# Patient Record
Sex: MALE | Race: WHITE | NOT HISPANIC OR LATINO | URBAN - METROPOLITAN AREA
[De-identification: names, ages, dates, MRNs, and addresses within clinical notes are randomized per-mention and may not be internally consistent; named-entity substitution may affect disease eponyms.]

---

## 2019-03-01 ENCOUNTER — EMERGENCY (EMERGENCY)
Facility: HOSPITAL | Age: 49
LOS: 0 days | Discharge: HOME | End: 2019-03-02
Attending: EMERGENCY MEDICINE | Admitting: EMERGENCY MEDICINE

## 2019-03-01 VITALS
SYSTOLIC BLOOD PRESSURE: 166 MMHG | RESPIRATION RATE: 18 BRPM | TEMPERATURE: 98 F | DIASTOLIC BLOOD PRESSURE: 85 MMHG | HEART RATE: 77 BPM | OXYGEN SATURATION: 98 %

## 2019-03-01 DIAGNOSIS — R05 COUGH: ICD-10-CM

## 2019-03-01 DIAGNOSIS — R55 SYNCOPE AND COLLAPSE: ICD-10-CM

## 2019-03-01 DIAGNOSIS — F17.210 NICOTINE DEPENDENCE, CIGARETTES, UNCOMPLICATED: ICD-10-CM

## 2019-03-01 DIAGNOSIS — R07.9 CHEST PAIN, UNSPECIFIED: ICD-10-CM

## 2019-03-01 DIAGNOSIS — R06.02 SHORTNESS OF BREATH: ICD-10-CM

## 2019-03-01 LAB
ALBUMIN SERPL ELPH-MCNC: 4.4 G/DL — SIGNIFICANT CHANGE UP (ref 3.5–5.2)
ALP SERPL-CCNC: 60 U/L — SIGNIFICANT CHANGE UP (ref 30–115)
ALT FLD-CCNC: 28 U/L — SIGNIFICANT CHANGE UP (ref 0–41)
ANION GAP SERPL CALC-SCNC: 11 MMOL/L — SIGNIFICANT CHANGE UP (ref 7–14)
AST SERPL-CCNC: 22 U/L — SIGNIFICANT CHANGE UP (ref 0–41)
BASOPHILS # BLD AUTO: 0.03 K/UL — SIGNIFICANT CHANGE UP (ref 0–0.2)
BASOPHILS NFR BLD AUTO: 0.4 % — SIGNIFICANT CHANGE UP (ref 0–1)
BILIRUB SERPL-MCNC: 0.3 MG/DL — SIGNIFICANT CHANGE UP (ref 0.2–1.2)
BUN SERPL-MCNC: 23 MG/DL — HIGH (ref 10–20)
CALCIUM SERPL-MCNC: 9.8 MG/DL — SIGNIFICANT CHANGE UP (ref 8.5–10.1)
CHLORIDE SERPL-SCNC: 101 MMOL/L — SIGNIFICANT CHANGE UP (ref 98–110)
CO2 SERPL-SCNC: 27 MMOL/L — SIGNIFICANT CHANGE UP (ref 17–32)
CREAT SERPL-MCNC: 1.3 MG/DL — SIGNIFICANT CHANGE UP (ref 0.7–1.5)
EOSINOPHIL # BLD AUTO: 0.18 K/UL — SIGNIFICANT CHANGE UP (ref 0–0.7)
EOSINOPHIL NFR BLD AUTO: 2.5 % — SIGNIFICANT CHANGE UP (ref 0–8)
GLUCOSE SERPL-MCNC: 110 MG/DL — HIGH (ref 70–99)
HCT VFR BLD CALC: 51.2 % — SIGNIFICANT CHANGE UP (ref 42–52)
HGB BLD-MCNC: 17.9 G/DL — SIGNIFICANT CHANGE UP (ref 14–18)
IMM GRANULOCYTES NFR BLD AUTO: 0.3 % — SIGNIFICANT CHANGE UP (ref 0.1–0.3)
LYMPHOCYTES # BLD AUTO: 2.06 K/UL — SIGNIFICANT CHANGE UP (ref 1.2–3.4)
LYMPHOCYTES # BLD AUTO: 29 % — SIGNIFICANT CHANGE UP (ref 20.5–51.1)
MCHC RBC-ENTMCNC: 30.3 PG — SIGNIFICANT CHANGE UP (ref 27–31)
MCHC RBC-ENTMCNC: 35 G/DL — SIGNIFICANT CHANGE UP (ref 32–37)
MCV RBC AUTO: 86.6 FL — SIGNIFICANT CHANGE UP (ref 80–94)
MONOCYTES # BLD AUTO: 0.61 K/UL — HIGH (ref 0.1–0.6)
MONOCYTES NFR BLD AUTO: 8.6 % — SIGNIFICANT CHANGE UP (ref 1.7–9.3)
NEUTROPHILS # BLD AUTO: 4.2 K/UL — SIGNIFICANT CHANGE UP (ref 1.4–6.5)
NEUTROPHILS NFR BLD AUTO: 59.2 % — SIGNIFICANT CHANGE UP (ref 42.2–75.2)
NRBC # BLD: 0 /100 WBCS — SIGNIFICANT CHANGE UP (ref 0–0)
PLATELET # BLD AUTO: 256 K/UL — SIGNIFICANT CHANGE UP (ref 130–400)
POTASSIUM SERPL-MCNC: 4.1 MMOL/L — SIGNIFICANT CHANGE UP (ref 3.5–5)
POTASSIUM SERPL-SCNC: 4.1 MMOL/L — SIGNIFICANT CHANGE UP (ref 3.5–5)
PROT SERPL-MCNC: 7.3 G/DL — SIGNIFICANT CHANGE UP (ref 6–8)
RBC # BLD: 5.91 M/UL — SIGNIFICANT CHANGE UP (ref 4.7–6.1)
RBC # FLD: 13.2 % — SIGNIFICANT CHANGE UP (ref 11.5–14.5)
SODIUM SERPL-SCNC: 139 MMOL/L — SIGNIFICANT CHANGE UP (ref 135–146)
TROPONIN T SERPL-MCNC: <0.01 NG/ML — SIGNIFICANT CHANGE UP
WBC # BLD: 7.1 K/UL — SIGNIFICANT CHANGE UP (ref 4.8–10.8)
WBC # FLD AUTO: 7.1 K/UL — SIGNIFICANT CHANGE UP (ref 4.8–10.8)

## 2019-03-01 RX ORDER — ASPIRIN/CALCIUM CARB/MAGNESIUM 324 MG
325 TABLET ORAL ONCE
Qty: 0 | Refills: 0 | Status: COMPLETED | OUTPATIENT
Start: 2019-03-01 | End: 2019-03-01

## 2019-03-01 RX ORDER — DEXAMETHASONE 0.5 MG/5ML
4 ELIXIR ORAL ONCE
Qty: 0 | Refills: 0 | Status: COMPLETED | OUTPATIENT
Start: 2019-03-01 | End: 2019-03-01

## 2019-03-01 RX ORDER — SODIUM CHLORIDE 9 MG/ML
1000 INJECTION INTRAMUSCULAR; INTRAVENOUS; SUBCUTANEOUS ONCE
Qty: 0 | Refills: 0 | Status: COMPLETED | OUTPATIENT
Start: 2019-03-01 | End: 2019-03-01

## 2019-03-01 RX ORDER — KETOROLAC TROMETHAMINE 30 MG/ML
15 SYRINGE (ML) INJECTION ONCE
Qty: 0 | Refills: 0 | Status: DISCONTINUED | OUTPATIENT
Start: 2019-03-01 | End: 2019-03-01

## 2019-03-01 RX ADMIN — SODIUM CHLORIDE 1000 MILLILITER(S): 9 INJECTION INTRAMUSCULAR; INTRAVENOUS; SUBCUTANEOUS at 22:46

## 2019-03-01 RX ADMIN — Medication 325 MILLIGRAM(S): at 22:46

## 2019-03-01 RX ADMIN — SODIUM CHLORIDE 1000 MILLILITER(S): 9 INJECTION INTRAMUSCULAR; INTRAVENOUS; SUBCUTANEOUS at 19:49

## 2019-03-01 RX ADMIN — Medication 4 MILLIGRAM(S): at 19:49

## 2019-03-01 RX ADMIN — Medication 15 MILLIGRAM(S): at 20:04

## 2019-03-01 RX ADMIN — Medication 15 MILLIGRAM(S): at 19:49

## 2019-03-01 NOTE — ED ADULT TRIAGE NOTE - CHIEF COMPLAINT QUOTE
patient had 1 episode of Shortness of breath on Wednesday with cough, patient also c/o sore throat x 3 weeks

## 2019-03-01 NOTE — ED PROVIDER NOTE - OBJECTIVE STATEMENT
49yo M no sig pmh pw cough, sore throat x1mo not improving. Also co shortness of breath when coughing, chest pain on coughing. Not associated with any other factors. Also co submandibular pain on swallowing. No fevers/chills, abdominal pain, nausea/vomiting, diarrhea, dysuria/hematuria, back pain, numbness/focal weakness. Had an episode while coughing on Weds night where he froze. Back to baseline immediately. No urinary or fecal incontinence. No other events since. 47yo M no sig pmh pw cough, sore throat x1mo not improving. Also co shortness of breath when coughing, intermittent substernal chest pain on coughing. Not associated with any other factors. Also co submandibular pain on swallowing. No fevers/chills, abdominal pain, nausea/vomiting, diarrhea, dysuria/hematuria, back pain, numbness/focal weakness. Had an episode while coughing on Weds night where he froze. Back to baseline immediately. No urinary or fecal incontinence. No other events since.

## 2019-03-01 NOTE — ED PROVIDER NOTE - CLINICAL SUMMARY MEDICAL DECISION MAKING FREE TEXT BOX
49 yo man with syncope and intermittent lightheadedness and chest pains.  Abnormal EKG.  Will need to be monitored overnight in observation unit.  ? Dysrhythmia.  2D Echo and CCTA in the morning.  Patient agreeable.

## 2019-03-01 NOTE — ED PROVIDER NOTE - NS ED ROS FT
Constitutional:  See HPI.   Eyes:  No visual changes, eye pain or discharge.  ENMT:  No hearing changes, pain, discharge or infections. No neck pain or stiffness.  Cardiac:    No chest pain with exertion.  Respiratory:  No respiratory distress. No hemoptysis.  GI:  No nausea, vomiting, diarrhea, abdominal pain.  :  No dysuria, frequency, hematuria  MS:  No joint pain or back pain.  Neuro:  No LOC. No headache or weakness.    Skin:  No skin rash.  Except as in HPI, all other review of systems is negative

## 2019-03-01 NOTE — ED CDU PROVIDER INITIAL DAY NOTE - OBJECTIVE STATEMENT
pt is a 48y male no pmhx comes to the ed complaining of the flu and then lingering cough x1 mos patient states he didn't full recover. Pt is also experiencing intermittent CP substernal, dull in nature. pt also reports to having a sore throat. Pt states he had a syncopal like episode after coughing for a prolong period of time and his wife was there to witness it. pt was sitting and right when he went to get up had a syncopal like episode an regained consciousness within seconds. pt states his cp is relieved with Advil and ASA. pt denies any f/c/n/v, abdominal complaints, swelling of legs, dizziness, visual changes, prodromes, back pain, radiating cp, non smoker, no familial hx of CAD.

## 2019-03-01 NOTE — ED PROVIDER NOTE - PHYSICAL EXAMINATION
Con: Well appearing NAD non toxic.   HEENT: NCAT EOMI conjunctiva normal. No nasal discharge. MMM.   Neck: Supple, non tender, full ROM.   CV: RRR no MRG +S1S2.   Pulm: CTA b/l.   Abd: s NT ND +BS.   Ext: WWP x4, moving all extremities, no edema.   Psy: Cooperative, appropriate.   Skin: Warm, dry, no rash  Neuro: CN2-12 grossly intact no sensory or motor deficits throughout, no drift, rapid alternating wnl, no ataxia, neg romberg.
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
## 2019-03-01 NOTE — ED CDU PROVIDER INITIAL DAY NOTE - ATTENDING CONTRIBUTION TO CARE
I personally evaluated the patient. I reviewed the Resident’s or Physician Assistant’s note (as assigned above), and agree with the findings and plan except as documented in my note.  49 yo man with syncope and intermittent light headedness and chest pains.  Will need 2 D echo and CCTA.

## 2019-03-02 VITALS — HEART RATE: 62 BPM

## 2019-03-02 LAB — TROPONIN T SERPL-MCNC: <0.01 NG/ML — SIGNIFICANT CHANGE UP

## 2019-03-02 RX ORDER — METOPROLOL TARTRATE 50 MG
100 TABLET ORAL ONCE
Qty: 0 | Refills: 0 | Status: COMPLETED | OUTPATIENT
Start: 2019-03-02 | End: 2019-03-02

## 2019-03-02 RX ADMIN — Medication 100 MILLIGRAM(S): at 10:00

## 2019-03-02 NOTE — ED ADULT NURSE NOTE - OBJECTIVE STATEMENT
Pt c/o SOB x1 day on Wednesday with cough, patient also c/o sore throat x 3 weeks. Denies n/v/d, fevers/chills. Pt also c/o intermittent random chest pain. Pt also states he had an episode "where he could not stop staring x1 min", as per Wife last month.
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
## 2019-03-02 NOTE — ED CDU PROVIDER DISPOSITION NOTE - CLINICAL COURSE
Pt reports he was coughing and then syncope. Pt recently with URI. Placed into observation for evaluation. While in observation pt was on continuous cardiac monitoring. Serial cardiac markers neg. CCTA neg. Echo mild valvular disease.

## 2019-03-02 NOTE — ED CDU PROVIDER DISPOSITION NOTE - CARE PROVIDERS DIRECT ADDRESSES
,[email or web address]

## 2019-03-02 NOTE — ED CDU PROVIDER SUBSEQUENT DAY NOTE - HISTORY
pt seen bedside, NAD, negative enzymesx2 and nl ekg. will go for CCTA in the AM. pt is a 48y male no pmhx comes to the ed complaining of the flu and then lingering cough x1 mos patient states he didn't full recover. Pt is also experiencing intermittent CP substernal, dull in nature.   pt seen bedside, NAD, negative enzymesx2 and nl ekg. will go for CCTA in the AM.

## 2019-03-02 NOTE — ED CDU PROVIDER DISPOSITION NOTE - CARE PROVIDER_API CALL
Chriss Steen)  Neurology; Neuromuscular Medicine  [address], [address]  [place], [place] [id number]  Phone: [phone]  Fax: [phone]  Follow Up Time:     Edmund Mock)  Cardiovascular Disease; Internal Medicine; Interventional Cardiology  [address] [place] [address], [address]  [place] [address]  Phone: [phone]  Fax: [phone]  Follow Up Time:

## 2019-03-12 PROBLEM — Z00.00 ENCOUNTER FOR PREVENTIVE HEALTH EXAMINATION: Status: ACTIVE | Noted: 2019-03-12

## 2019-03-28 ENCOUNTER — APPOINTMENT (OUTPATIENT)
Dept: INTERNAL MEDICINE | Facility: CLINIC | Age: 49
End: 2019-03-28

## 2020-12-16 NOTE — ED ADULT NURSE NOTE - NS ED NOTE  TALK SOMEONE YN
Addendum 12/16 7:32am: Pt is currently in the ED and is expected to be admitted. Will f/u after discharge.  Yudi Engle, CbD     No

## 2022-03-10 NOTE — ED CDU PROVIDER INITIAL DAY NOTE - NEURO NEGATIVE STATEMENT, MLM
no loss of consciousness, no gait abnormality, no headache, no sensory deficits, and no weakness. Laura
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
## 2022-12-21 ENCOUNTER — APPOINTMENT (OUTPATIENT)
Dept: ENDOCRINOLOGY | Facility: CLINIC | Age: 52
End: 2022-12-21